# Patient Record
(demographics unavailable — no encounter records)

---

## 2025-04-16 NOTE — PHYSICAL EXAM
[Right] : right foot and ankle [5___] : eversion 5[unfilled]/5 [] : non-antalgic [FreeTextEntry8] : mild tenderness ATFL

## 2025-04-16 NOTE — HISTORY OF PRESENT ILLNESS
[9] : 9 [0] : 0 [Localized] : localized [Shooting] : shooting [Intermittent] : intermittent [Standing] : standing [Walking] : walking [Stairs] : stairs [Full time] : Work status: full time [de-identified] : 4/16/25: 27 yo male with right ankle pain since 4/15/25. Denies specific injury but did go golfing. He reports lateral ankle pain with walking. No prior injuries. [] : Post Surgical Visit: no [FreeTextEntry1] : Right ankle [FreeTextEntry5] : Patient complains of ankle pain since yesterday. no injury/trauma. pain with WB. no prior hx

## 2025-04-16 NOTE — IMAGING
[Right] : right ankle [There are no fractures, subluxations or dislocations. No significant abnormalities are seen] : There are no fractures, subluxations or dislocations. No significant abnormalities are seen [FreeTextEntry9] : possible calcium lateral ligaments

## 2025-04-16 NOTE — ASSESSMENT
[FreeTextEntry1] : Recommend Ice, elevation Activity modfication. He will wear OTC ankle brace if needed. NSAIDs prn, declines rx. Follow up in 1-2 weeks.